# Patient Record
(demographics unavailable — no encounter records)

---

## 2024-11-11 NOTE — HEALTH RISK ASSESSMENT
[Yes] : Yes [Monthly or less (1 pt)] : Monthly or less (1 point) [1 or 2 (0 pts)] : 1 or 2 (0 points) [Never (0 pts)] : Never (0 points) [Little interest or pleasure doing things] : 1) Little interest or pleasure doing things [Feeling down, depressed, or hopeless] : 2) Feeling down, depressed, or hopeless [0] : 2) Feeling down, depressed, or hopeless: Not at all (0) [PHQ-2 Negative - No further assessment needed] : PHQ-2 Negative - No further assessment needed [Never] : Never [Audit-CScore] : 1 [de-identified] : Walking, recently started pilates  [de-identified] : Mindful eating: fish + chicken, cheese,  [[id number]] : 0

## 2024-11-11 NOTE — HISTORY OF PRESENT ILLNESS
[FreeTextEntry1] : Here to establish primary care and for annual CPE [de-identified] : History of HLD but afraid of starting statins due to risk of developing Alzheimer's Previous PCP reportedly pursued TTE and CCS both of which were unremarkable as per her verbal recall (no report available) Not fasting today  2023 labs reviewed:  /  A1C 5.6%   Scoliosis Chronic back pain Wed - started pilates, helping Requesting PT referral    Piece of glass in R foot since Aug Asymptomatic   Fall in June Fell on R arm This was followed by R arm stress holding paintings  X-ray / MRI done showed some tenosynovitis, no fracture    #HCM - Pap smear: ~2 years ago, wnl  - Mammogram: UTD (no report available) - Colonoscopy: Never had one - will be going to [facility] - Vaccines: Shingles, COVID, Flu, Tdap >>> interested and will return

## 2024-11-11 NOTE — HEALTH RISK ASSESSMENT
[Yes] : Yes [Monthly or less (1 pt)] : Monthly or less (1 point) [1 or 2 (0 pts)] : 1 or 2 (0 points) [Never (0 pts)] : Never (0 points) [Little interest or pleasure doing things] : 1) Little interest or pleasure doing things [Feeling down, depressed, or hopeless] : 2) Feeling down, depressed, or hopeless [0] : 2) Feeling down, depressed, or hopeless: Not at all (0) [PHQ-2 Negative - No further assessment needed] : PHQ-2 Negative - No further assessment needed [Never] : Never [Audit-CScore] : 1 [de-identified] : Walking, recently started pilates  [de-identified] : Mindful eating: fish + chicken, cheese,  [[id number]] : 0

## 2024-11-11 NOTE — HISTORY OF PRESENT ILLNESS
[FreeTextEntry1] : Here to establish primary care and for annual CPE [de-identified] : History of HLD but afraid of starting statins due to risk of developing Alzheimer's Previous PCP reportedly pursued TTE and CCS both of which were unremarkable as per her verbal recall (no report available) Not fasting today  2023 labs reviewed:  /  A1C 5.6%   Scoliosis Chronic back pain Wed - started pilates, helping Requesting PT referral    Piece of glass in R foot since Aug Asymptomatic   Fall in June Fell on R arm This was followed by R arm stress holding paintings  X-ray / MRI done showed some tenosynovitis, no fracture    #HCM - Pap smear: ~2 years ago, wnl  - Mammogram: UTD (no report available) - Colonoscopy: Never had one - will be going to [facility] - Vaccines: Shingles, COVID, Flu, Tdap >>> interested and will return

## 2024-11-11 NOTE — REVIEW OF SYSTEMS
[Chest Pain] : no chest pain [Palpitations] : no palpitations [Leg Claudication] : no leg claudication [Lower Ext Edema] : no lower extremity edema [Orthopnea] : no orthopnea [Paroxysmal Nocturnal Dyspnea] : no paroxysmal nocturnal dyspnea [Shortness Of Breath] : no shortness of breath [Wheezing] : no wheezing [Cough] : no cough [Dyspnea on Exertion] : no dyspnea on exertion

## 2024-11-11 NOTE — PHYSICAL EXAM
[No Acute Distress] : no acute distress [Well Nourished] : well nourished [Well Developed] : well developed [Well-Appearing] : well-appearing [Normal Voice/Communication] : normal voice/communication [Normal Sclera/Conjunctiva] : normal sclera/conjunctiva [EOMI] : extraocular movements intact [Normal Outer Ear/Nose] : the outer ears and nose were normal in appearance [Supple] : supple [No Respiratory Distress] : no respiratory distress  [No Accessory Muscle Use] : no accessory muscle use [Clear to Auscultation] : lungs were clear to auscultation bilaterally [Normal Rate] : normal rate  [Regular Rhythm] : with a regular rhythm [Normal S1, S2] : normal S1 and S2 [No Murmur] : no murmur heard [No Edema] : there was no peripheral edema [Soft] : abdomen soft [Non Tender] : non-tender [Non-distended] : non-distended [No CVA Tenderness] : no CVA  tenderness [No Joint Swelling] : no joint swelling [No Rash] : no rash [Normal Gait] : normal gait [Speech Grossly Normal] : speech grossly normal [Memory Grossly Normal] : memory grossly normal [Normal Affect] : the affect was normal [Alert and Oriented x3] : oriented to person, place, and time [Normal Mood] : the mood was normal [Normal Insight/Judgement] : insight and judgment were intact [Thyroid Normal, No Nodules] : the thyroid was normal and there were no nodules present

## 2024-11-11 NOTE — ASSESSMENT
[FreeTextEntry1] : #Mixed HLD (2023 labs reviewed:  / ) - Afraid of starting statins due to risk of developing Alzheimer's: extensively counseling done to clarify that statins have not been proven to cause Alzheimer's and, in fact, elevated LDL is a risk factor for Alzheimer's - Risks of statins discussed e.g. myalgia, elevated blood glucose, transaminitis etc. Referred to further resources regarding safety profile of statins. Also shared that she may choose to be another lipid-lowering medication. She will reflect at home and get back.  - Diet: Advised lean protein and fiber optimized plant-forward whole food diet with calorie control, minimize ultra-processed foods - Exercise: Strive for regular physical activity throughout the day (walks, exercise snacks, house chores, gardening, hiking, taking stairs, playing sports - everything counts). Optimally, advised to incorporate both cardio and strength training every week to improve cardiorespiratory fitness as well as muscle and bone strength. - Sleep: Sleep hygiene discussed. Plan for sleep in advance. Advised 7-9 hours based on how well-rested one feels with consistent sleep-wake timings. - Previous PCP reportedly pursued TTE and CCS both of which were unremarkable as per her verbal recall (no report available) >>> advised to obtain records and share - She will RTC for fasting lipid profile  #Scoliosis #Chronic back pain - PT referral    #Piece of glass in R foot since Aug (asymptomatic) - Refer to podiatry for removal    #RUE tenosynovitis s/p fall - Minimize use shira carrying heavy paintings  - May use wrist splint/brace at night PRN   #HCM - Pap smear: ~2 years ago, wnl  - Mammogram: UTD (no report available) - Colonoscopy: Never had one - will be going to [facility] - Vaccines: Shingles, COVID, Flu, Tdap >>> interested and will return

## 2024-11-20 NOTE — HISTORY OF PRESENT ILLNESS
[de-identified] : Pt presents for pap smear today. PCP Dr. Fletcher  H/o abn pap: HPV in the past, last normal  Last pap: ~2 years ago  Last pap with HPV ~8 years ago Part of ovary removed 2/2 cysts Still getting some periods Would like to check vaginitis panel as well

## 2024-11-20 NOTE — PLAN
[FreeTextEntry1] : #HM - Pap with HPV co-testing - Bloodwork as ordered by PCP - Vaginitis panel - F/u with Dr. Fletcher

## 2024-11-20 NOTE — PHYSICAL EXAM
[No Acute Distress] : no acute distress [Well-Appearing] : well-appearing [Normal Sclera/Conjunctiva] : normal sclera/conjunctiva [EOMI] : extraocular movements intact [Normal Outer Ear/Nose] : the outer ears and nose were normal in appearance [No Respiratory Distress] : no respiratory distress  [External Female Genitalia] : normal external genitalia [Vagina] : normal vaginal exam [Cervix] : normal cervix [Chaperone Present] : A chaperone was present in the examining room during all aspects of the physical examination [[id number]] : A chaperone was present during the pelvic exam. [Coordination Grossly Intact] : coordination grossly intact [Normal Gait] : normal gait [Normal Affect] : the affect was normal [Normal Insight/Judgement] : insight and judgment were intact [FreeTextEntry2] : Jasmyne

## 2025-01-29 NOTE — DISCUSSION/SUMMARY
[FreeTextEntry1] : Pt currently perimenopausal -pts endorsement of perimenopause including fatigue, insomnia, weight gain  and hot flashes are all within the normal range for perimenopause.  -ASCUS w/ HPV negative- repeat pap 1 yr from nov 2024 -pt counseled on perimenopause and states understanding -pt to return if s/s of perimenopause become more bothersome seen and d/w Dr. Ruiz DC PGY4

## 2025-01-29 NOTE — HISTORY OF PRESENT ILLNESS
[perimenopausal] : perimenopausal [N] : Patient is not sexually active [Y] : Positive pregnancy history [Previously active] : previously active [PapSmeardate] : 11/2024 [TextBox_31] : ASCUS [ColonoscopyDate] : 12/6/2024 [TextBox_43] : POLYPS [LMPDate] : 10/2024 [PGHxTotal] : 2 [PGHxAbortions] : 2 [FreeTextEntry1] : 10/2024

## 2025-01-29 NOTE — HISTORY OF PRESENT ILLNESS
[perimenopausal] : perimenopausal [N] : Patient is not sexually active [Y] : Positive pregnancy history [Previously active] : previously active [PapSmeardate] : 11/2024 [TextBox_31] : ASCUS [ColonoscopyDate] : 12/6/2024 [TextBox_43] : POLYPS [LMPDate] : 10/2024 [PGHxTotal] : 2 [PGHxAbortions] : 2 [FreeTextEntry1] : 10/2024 DISPLAY PLAN FREE TEXT